# Patient Record
Sex: MALE
[De-identification: names, ages, dates, MRNs, and addresses within clinical notes are randomized per-mention and may not be internally consistent; named-entity substitution may affect disease eponyms.]

---

## 2024-07-10 PROBLEM — Z00.00 ENCOUNTER FOR PREVENTIVE HEALTH EXAMINATION: Status: ACTIVE | Noted: 2024-07-10

## 2024-07-11 ENCOUNTER — APPOINTMENT (OUTPATIENT)
Dept: SURGERY | Facility: CLINIC | Age: 69
End: 2024-07-11
Payer: COMMERCIAL

## 2024-07-11 VITALS
OXYGEN SATURATION: 97 % | HEART RATE: 102 BPM | WEIGHT: 144 LBS | DIASTOLIC BLOOD PRESSURE: 82 MMHG | BODY MASS INDEX: 21.33 KG/M2 | HEIGHT: 69 IN | SYSTOLIC BLOOD PRESSURE: 160 MMHG

## 2024-07-11 DIAGNOSIS — K40.90 UNILATERAL INGUINAL HERNIA, W/OUT OBSTRUCTION OR GANGRENE, NOT SPECIFIED AS RECURRENT: ICD-10-CM

## 2024-07-11 DIAGNOSIS — Z78.9 OTHER SPECIFIED HEALTH STATUS: ICD-10-CM

## 2024-07-11 DIAGNOSIS — K40.20 BILATERAL INGUINAL HERNIA, W/OUT OBSTRUCTION OR GANGRENE, NOT SPECIFIED AS RECURRENT: ICD-10-CM

## 2024-07-11 PROCEDURE — 99203 OFFICE O/P NEW LOW 30 MIN: CPT

## 2024-07-19 ENCOUNTER — OUTPATIENT (OUTPATIENT)
Dept: OUTPATIENT SERVICES | Facility: HOSPITAL | Age: 69
LOS: 1 days | End: 2024-07-19
Payer: MEDICARE

## 2024-07-19 VITALS
OXYGEN SATURATION: 98 % | WEIGHT: 145.06 LBS | HEIGHT: 69 IN | RESPIRATION RATE: 16 BRPM | SYSTOLIC BLOOD PRESSURE: 135 MMHG | HEART RATE: 76 BPM | DIASTOLIC BLOOD PRESSURE: 60 MMHG | TEMPERATURE: 99 F

## 2024-07-19 DIAGNOSIS — K40.90 UNILATERAL INGUINAL HERNIA, WITHOUT OBSTRUCTION OR GANGRENE, NOT SPECIFIED AS RECURRENT: ICD-10-CM

## 2024-07-19 DIAGNOSIS — Z01.818 ENCOUNTER FOR OTHER PREPROCEDURAL EXAMINATION: ICD-10-CM

## 2024-07-19 NOTE — H&P PST ADULT - ASSESSMENT
68 y/o Belgali-speaking male (accompanied by son, preferred ) with no reported PMH is diagnosed with unilateral inguinal hernia without obstruction or gangrene, not recurrent.     STOP BANG SCORE IS 2

## 2024-07-19 NOTE — H&P PST ADULT - GASTROINTESTINAL
soft/nontender/nondistended/normal active bowel sounds/no guarding/no rigidity/no organomegaly/no palpable leena negative

## 2024-07-19 NOTE — H&P PST ADULT - PROBLEM SELECTOR PLAN 1
Scheduled for right inguinoscrotal hernia repair with mesh on 7/24/2024  Preoperative instructions discussed and given to patient.   Patient agrees to follow up with surgeon's office for instructions prior to surgery   Discussed preprocedure skin preparation using  chlorhexidine gluconate 4% solution three days prior to  surgery - including the day of surgery  Instructed patient to avoid aspirin and aspirin products, over the counter medications such as vitamins and herbal medications, one week prior to surgery.  Take Tylenol as needed for pain  Follow up with PCP postoperatively for management of chronic conditions  Labs/ekg/mc completed with PCP - pending in PST  Patient verbalized understanding of instructions

## 2024-07-19 NOTE — H&P PST ADULT - NSWEIGHTCALCTOOLDRUG_GEN_A_CORE
Post-Care Instructions: I reviewed with the patient in detail post-care instructions. Patient is to wear sunprotection, and avoid picking at any of the treated lesions. Pt may apply Vaseline to crusted or scabbing areas. Include Z78.9 (Other Specified Conditions Influencing Health Status) As An Associated Diagnosis?: No Detail Level: Detailed Show Topical Anesthesia Variable?: Yes Consent: The patient's consent was obtained including but not limited to risks of crusting, scabbing, blistering, scarring, darker or lighter pigmentary change, recurrence, incomplete removal and infection. Medical Necessity Information: It is in your best interest to select a reason for this procedure from the list below. All of these items fulfill various CMS LCD requirements except the new and changing color options. Spray Paint Text: The liquid nitrogen was applied to the skin utilizing a spray paint frosting technique. Medical Necessity Clause: This procedure was medically necessary because the lesions that were treated were: Number Of Freeze-Thaw Cycles: 2 freeze-thaw cycles Application Tool (Optional): Cry-AC  used

## 2024-07-19 NOTE — H&P PST ADULT - HISTORY OF PRESENT ILLNESS
68 y/o Belgali-speaking male with no reported PMH complains of right groin mass that has been gradually getting bigger over the past 6 months. He is diagnosed with unilateral inguinal hernia without obstruction or gangrene, not recurrent. Patient is scheduled for right inguinoscrotal hernia repair with mesh on 7/24/2024

## 2024-07-22 PROCEDURE — G0463: CPT

## 2024-07-23 ENCOUNTER — TRANSCRIPTION ENCOUNTER (OUTPATIENT)
Age: 69
End: 2024-07-23

## 2024-07-24 ENCOUNTER — TRANSCRIPTION ENCOUNTER (OUTPATIENT)
Age: 69
End: 2024-07-24

## 2024-07-24 ENCOUNTER — APPOINTMENT (OUTPATIENT)
Dept: SURGERY | Facility: HOSPITAL | Age: 69
End: 2024-07-24

## 2024-07-24 ENCOUNTER — RESULT REVIEW (OUTPATIENT)
Age: 69
End: 2024-07-24

## 2024-07-25 PROBLEM — Z78.9 OTHER SPECIFIED HEALTH STATUS: Chronic | Status: ACTIVE | Noted: 2024-07-19

## 2024-07-31 ENCOUNTER — NON-APPOINTMENT (OUTPATIENT)
Age: 69
End: 2024-07-31

## 2024-08-01 ENCOUNTER — APPOINTMENT (OUTPATIENT)
Dept: SURGERY | Facility: CLINIC | Age: 69
End: 2024-08-01
Payer: COMMERCIAL

## 2024-08-01 DIAGNOSIS — K40.90 UNILATERAL INGUINAL HERNIA, W/OUT OBSTRUCTION OR GANGRENE, NOT SPECIFIED AS RECURRENT: ICD-10-CM

## 2024-08-01 PROCEDURE — 99024 POSTOP FOLLOW-UP VISIT: CPT

## 2024-08-01 NOTE — HISTORY OF PRESENT ILLNESS
[de-identified] : Mr. BECERRA is a 69 year y/o M w large bulge to both sides of the groin. Pt notes having increased bulge to the areas and here to be evaluated for inguinal hernias. Does not have much pain. Denies significant PMH. No home meds. No previous surgical history.   Pt here today for postop visit, S/P repair of right inguinal scrotal hernia 07/24/24.  Pt has some tenderness, but overall, feels well. No urinary complaints. No fevers or chills. Also has an inguinal hernia to the L side.

## 2024-08-01 NOTE — HISTORY OF PRESENT ILLNESS
[de-identified] : Mr. BECERRA is a 69 year y/o M w large bulge to both sides of the groin. Pt notes having increased bulge to the areas and here to be evaluated for inguinal hernias. Does not have much pain. Denies significant PMH. No home meds. No previous surgical history.   Pt here today for postop visit, S/P repair of right inguinal scrotal hernia 07/24/24.  Pt has some tenderness, but overall, feels well. No urinary complaints. No fevers or chills. Also has an inguinal hernia to the L side.

## 2024-08-01 NOTE — PLAN
[FreeTextEntry1] : Post operative wound care, activity and restrictions/precautions were reinforced. Patient was instructed to refrain from any heavy lifting >10-15 lbs for at least 4-6 weeks post operatively. Patient's questions and concerns addressed.  Mr. LESTER BECERRA  was informed of significance of findings. All options, risks and benefits were discussed at length. Informed consent for repair of LEFT inguinoscrotal hernia, with the use of mesh, and the potential post op complications were discussed, including infection, recurrence and other related complications.  The patient wishes to proceed with the planned surgery. We will schedule for surgery at the next available date, pending any required insurance pre-certification or pre-approval. Patient agrees to obtain any necessary pre-operative evaluations and testing prior to surgery. he was advised to seek immediate medical attention with any acute change in symptoms or with the development of any new or worsening symptoms.  Patient's questions and concerns addressed to patient's satisfaction, and patient verbalized an understanding of the information discussed.  Will schedule for the surgery at [facility] at [place]-- in 1 month.

## 2024-08-01 NOTE — PHYSICAL EXAM
[No Rash or Lesion] : No rash or lesion [Alert] : alert [Oriented to Person] : oriented to person [Oriented to Place] : oriented to place [Oriented to Time] : oriented to time [Calm] : calm [de-identified] : A/Ox3; NAD. appears comfortable [de-identified] : EOMI; sclera anicteric. [de-identified] : no cervical lymphadenopathy  [de-identified] : airway patent, no use of accessory muscles [de-identified] : Abd is soft, nondistended, no rebound or guarding. No abdominal masses. No abdominal tenderness. [de-identified] : incision site to the R side of groin healing well, no evidence of acute inflammation or infection no recurrence; L inguinoscrotal hernia  [de-identified] : +ROM, normal gait

## 2024-08-01 NOTE — HISTORY OF PRESENT ILLNESS
[de-identified] : Mr. BECERRA is a 69 year y/o M w large bulge to both sides of the groin. Pt notes having increased bulge to the areas and here to be evaluated for inguinal hernias. Does not have much pain. Denies significant PMH. No home meds. No previous surgical history.   Pt here today for postop visit, S/P repair of right inguinal scrotal hernia 07/24/24.  Pt has some tenderness, but overall, feels well. No urinary complaints. No fevers or chills. Also has an inguinal hernia to the L side.

## 2024-08-01 NOTE — PLAN
[FreeTextEntry1] : Post operative wound care, activity and restrictions/precautions were reinforced. Patient was instructed to refrain from any heavy lifting >10-15 lbs for at least 4-6 weeks post operatively. Patient's questions and concerns addressed.  Mr. LESTER BECERRA  was informed of significance of findings. All options, risks and benefits were discussed at length. Informed consent for repair of LEFT inguinoscrotal hernia, with the use of mesh, and the potential post op complications were discussed, including infection, recurrence and other related complications.  The patient wishes to proceed with the planned surgery. We will schedule for surgery at the next available date, pending any required insurance pre-certification or pre-approval. Patient agrees to obtain any necessary pre-operative evaluations and testing prior to surgery. he was advised to seek immediate medical attention with any acute change in symptoms or with the development of any new or worsening symptoms.  Patient's questions and concerns addressed to patient's satisfaction, and patient verbalized an understanding of the information discussed.  Will schedule for the surgery at [facility] at [place]-- in 1 month.

## 2024-08-01 NOTE — PHYSICAL EXAM
[No Rash or Lesion] : No rash or lesion [Alert] : alert [Oriented to Person] : oriented to person [Oriented to Place] : oriented to place [Oriented to Time] : oriented to time [Calm] : calm [de-identified] : A/Ox3; NAD. appears comfortable [de-identified] : EOMI; sclera anicteric. [de-identified] : no cervical lymphadenopathy  [de-identified] : airway patent, no use of accessory muscles [de-identified] : Abd is soft, nondistended, no rebound or guarding. No abdominal masses. No abdominal tenderness. [de-identified] : incision site to the R side of groin healing well, no evidence of acute inflammation or infection no recurrence; L inguinoscrotal hernia  [de-identified] : +ROM, normal gait

## 2024-08-01 NOTE — REASON FOR VISIT
[Post Op: _________] : a [unfilled] post op visit [Family Member] : family member [FreeTextEntry1] : S/P repair of right inguinal scrotal hernia 07/24/24

## 2024-08-01 NOTE — PHYSICAL EXAM
[No Rash or Lesion] : No rash or lesion [Alert] : alert [Oriented to Person] : oriented to person [Oriented to Place] : oriented to place [Oriented to Time] : oriented to time [Calm] : calm [de-identified] : A/Ox3; NAD. appears comfortable [de-identified] : EOMI; sclera anicteric. [de-identified] : no cervical lymphadenopathy  [de-identified] : airway patent, no use of accessory muscles [de-identified] : Abd is soft, nondistended, no rebound or guarding. No abdominal masses. No abdominal tenderness. [de-identified] : incision site to the R side of groin healing well, no evidence of acute inflammation or infection no recurrence; L inguinoscrotal hernia  [de-identified] : +ROM, normal gait

## 2024-08-01 NOTE — ASSESSMENT
[FreeTextEntry1] : IMP: 68 yo M with bilateral inguinoscrotal hernias; with R side(incarcerated) > L side.  S/P repair of right inguinal scrotal hernia 07/24/24.   Wound to R side of groin healing well- no recurrence.  Has a L inguinoscrotal hernia.

## 2024-08-01 NOTE — CONSULT LETTER
[Dear  ___] : Dear  [unfilled], [Consult Letter:] : I had the pleasure of evaluating your patient, [unfilled]. [Consult Closing:] : Thank you very much for allowing me to participate in the care of this patient.  If you have any questions, please do not hesitate to contact me. [Sincerely,] : Sincerely, [FreeTextEntry3] : Vineet Hernandez MD

## 2024-08-01 NOTE — PLAN
[FreeTextEntry1] : Post operative wound care, activity and restrictions/precautions were reinforced. Patient was instructed to refrain from any heavy lifting >10-15 lbs for at least 4-6 weeks post operatively. Patient's questions and concerns addressed.  Mr. LESTER BECERRA  was informed of significance of findings. All options, risks and benefits were discussed at length. Informed consent for repair of LEFT inguinoscrotal hernia, with the use of mesh, and the potential post op complications were discussed, including infection, recurrence and other related complications.  The patient wishes to proceed with the planned surgery. We will schedule for surgery at the next available date, pending any required insurance pre-certification or pre-approval. Patient agrees to obtain any necessary pre-operative evaluations and testing prior to surgery. he was advised to seek immediate medical attention with any acute change in symptoms or with the development of any new or worsening symptoms.  Patient's questions and concerns addressed to patient's satisfaction, and patient verbalized an understanding of the information discussed.  Will schedule for the surgery at [facility] at [place]-- in 1 month.

## 2024-08-01 NOTE — ASSESSMENT
[FreeTextEntry1] : IMP: 70 yo M with bilateral inguinoscrotal hernias; with R side(incarcerated) > L side.  S/P repair of right inguinal scrotal hernia 07/24/24.   Wound to R side of groin healing well- no recurrence.  Has a L inguinoscrotal hernia.

## 2024-08-19 ENCOUNTER — OUTPATIENT (OUTPATIENT)
Dept: OUTPATIENT SERVICES | Facility: HOSPITAL | Age: 69
LOS: 1 days | End: 2024-08-19
Payer: MEDICARE

## 2024-08-19 VITALS
TEMPERATURE: 98 F | HEART RATE: 70 BPM | RESPIRATION RATE: 16 BRPM | WEIGHT: 145.06 LBS | OXYGEN SATURATION: 98 % | HEIGHT: 69 IN | SYSTOLIC BLOOD PRESSURE: 150 MMHG | DIASTOLIC BLOOD PRESSURE: 79 MMHG

## 2024-08-19 DIAGNOSIS — K40.90 UNILATERAL INGUINAL HERNIA, WITHOUT OBSTRUCTION OR GANGRENE, NOT SPECIFIED AS RECURRENT: ICD-10-CM

## 2024-08-19 DIAGNOSIS — Z01.818 ENCOUNTER FOR OTHER PREPROCEDURAL EXAMINATION: ICD-10-CM

## 2024-08-19 DIAGNOSIS — Z98.890 OTHER SPECIFIED POSTPROCEDURAL STATES: Chronic | ICD-10-CM

## 2024-08-19 PROBLEM — Z78.9 OTHER SPECIFIED HEALTH STATUS: Chronic | Status: INACTIVE | Noted: 2024-07-19 | Resolved: 2024-08-19

## 2024-08-19 RX ORDER — SODIUM CHLORIDE 9 MG/ML
3 INJECTION INTRAMUSCULAR; INTRAVENOUS; SUBCUTANEOUS EVERY 8 HOURS
Refills: 0 | Status: DISCONTINUED | OUTPATIENT
Start: 2024-08-27 | End: 2024-09-10

## 2024-08-19 NOTE — H&P PST ADULT - HISTORY OF PRESENT ILLNESS
69 y.o male with PMHx significant for  Bilateral Inguinal hernia , s/p Right inguinal hernia repair 7/24/24 now schedule for Left Inguinoscrotal hernia Repair with Mesh on 8/27/24 with Dr Hernandez

## 2024-08-19 NOTE — H&P PST ADULT - PROBLEM SELECTOR PLAN 1
Pt schedule for Left Inguinoscrotal hernia Repair with Mesh on 8/27/24 with Dr Hernandez    Labs drawn by PCP - will f/u result  Pt was seen by PCP for Medical clearance 8/13 - will f/u report    Pt was  instructed to stop aspirin/ecotrin and all over the counter medication including vitamins and herbal supplements one week prior to surgery   Instructions given on the use of 4% chlorhexidine wash and Pt verbalized understanding of same   Pt Instructed to have nothing by mouth starting midnight day before surgery  Patient is to expect a phone call day before surgery between the hours of 430- 630pm giving arrival time for surgery   Written and verbal preoperative instructions given to patient with understanding verbalized via interpretation done by svetlana Chan Cameron- [phone]    Patient today with STOP bang score 2  Low  risk for FAIZA

## 2024-08-19 NOTE — H&P PST ADULT - ASSESSMENT
69 y.o male with Unilateral Inguinal hernia now schedule for Left Inguinoscrotal hernia Repair with Mesh on 8/27/24 with Dr David WATKINS 2

## 2024-08-19 NOTE — H&P PST ADULT - PATIENT'S PREFERRED PRONOUN
I have personally performed a face to face diagnostic evaluation on this patient. I have reviewed the ACP note and agree with the history, exam and plan of care, except as noted.
Him/He

## 2024-08-19 NOTE — H&P PST ADULT - NEGATIVE ENDOCRINE SYMPTOMS
Caller: Fransico James    Relationship to patient: Self    Best call back number: [phone]    Patient is needing: RECEIVED A CALL BACK FROM PT. HUB RESCHEDULE PT AT [place] LOCATION AND IS AWARE TO COMPLETE PSA PRIOR.THANK YOU.          no cold intolerance/no heat intolerance

## 2024-08-20 PROCEDURE — G0463: CPT

## 2024-08-26 ENCOUNTER — TRANSCRIPTION ENCOUNTER (OUTPATIENT)
Age: 69
End: 2024-08-26

## 2024-08-27 ENCOUNTER — RESULT REVIEW (OUTPATIENT)
Age: 69
End: 2024-08-27

## 2024-08-27 ENCOUNTER — TRANSCRIPTION ENCOUNTER (OUTPATIENT)
Age: 69
End: 2024-08-27

## 2024-08-27 ENCOUNTER — OUTPATIENT (OUTPATIENT)
Dept: OUTPATIENT SERVICES | Facility: HOSPITAL | Age: 69
LOS: 1 days | End: 2024-08-27
Payer: MEDICARE

## 2024-08-27 ENCOUNTER — APPOINTMENT (OUTPATIENT)
Dept: SURGERY | Facility: HOSPITAL | Age: 69
End: 2024-08-27

## 2024-08-27 VITALS
OXYGEN SATURATION: 98 % | DIASTOLIC BLOOD PRESSURE: 74 MMHG | WEIGHT: 145.06 LBS | TEMPERATURE: 98 F | HEIGHT: 69 IN | RESPIRATION RATE: 16 BRPM | SYSTOLIC BLOOD PRESSURE: 129 MMHG | HEART RATE: 72 BPM

## 2024-08-27 VITALS
OXYGEN SATURATION: 97 % | HEART RATE: 70 BPM | RESPIRATION RATE: 16 BRPM | TEMPERATURE: 98 F | SYSTOLIC BLOOD PRESSURE: 121 MMHG | DIASTOLIC BLOOD PRESSURE: 66 MMHG

## 2024-08-27 DIAGNOSIS — Z01.818 ENCOUNTER FOR OTHER PREPROCEDURAL EXAMINATION: ICD-10-CM

## 2024-08-27 DIAGNOSIS — Z98.890 OTHER SPECIFIED POSTPROCEDURAL STATES: Chronic | ICD-10-CM

## 2024-08-27 DIAGNOSIS — K40.90 UNILATERAL INGUINAL HERNIA, WITHOUT OBSTRUCTION OR GANGRENE, NOT SPECIFIED AS RECURRENT: ICD-10-CM

## 2024-08-27 LAB
ALBUMIN SERPL ELPH-MCNC: 3.8 G/DL — SIGNIFICANT CHANGE UP (ref 3.5–5)
ALP SERPL-CCNC: 97 U/L — SIGNIFICANT CHANGE UP (ref 40–120)
ALT FLD-CCNC: 23 U/L DA — SIGNIFICANT CHANGE UP (ref 10–60)
ANION GAP SERPL CALC-SCNC: 7 MMOL/L — SIGNIFICANT CHANGE UP (ref 5–17)
AST SERPL-CCNC: 15 U/L — SIGNIFICANT CHANGE UP (ref 10–40)
BILIRUB SERPL-MCNC: 0.5 MG/DL — SIGNIFICANT CHANGE UP (ref 0.2–1.2)
BUN SERPL-MCNC: 15 MG/DL — SIGNIFICANT CHANGE UP (ref 7–18)
CALCIUM SERPL-MCNC: 8.9 MG/DL — SIGNIFICANT CHANGE UP (ref 8.4–10.5)
CHLORIDE SERPL-SCNC: 109 MMOL/L — HIGH (ref 96–108)
CO2 SERPL-SCNC: 25 MMOL/L — SIGNIFICANT CHANGE UP (ref 22–31)
CREAT SERPL-MCNC: 0.83 MG/DL — SIGNIFICANT CHANGE UP (ref 0.5–1.3)
EGFR: 94 ML/MIN/1.73M2 — SIGNIFICANT CHANGE UP
GLUCOSE SERPL-MCNC: 117 MG/DL — HIGH (ref 70–99)
HCT VFR BLD CALC: 37.4 % — LOW (ref 39–50)
HGB BLD-MCNC: 12.2 G/DL — LOW (ref 13–17)
MCHC RBC-ENTMCNC: 30 PG — SIGNIFICANT CHANGE UP (ref 27–34)
MCHC RBC-ENTMCNC: 32.6 GM/DL — SIGNIFICANT CHANGE UP (ref 32–36)
MCV RBC AUTO: 92.1 FL — SIGNIFICANT CHANGE UP (ref 80–100)
NRBC # BLD: 0 /100 WBCS — SIGNIFICANT CHANGE UP (ref 0–0)
PLATELET # BLD AUTO: 279 K/UL — SIGNIFICANT CHANGE UP (ref 150–400)
POTASSIUM SERPL-MCNC: 4.1 MMOL/L — SIGNIFICANT CHANGE UP (ref 3.5–5.3)
POTASSIUM SERPL-SCNC: 4.1 MMOL/L — SIGNIFICANT CHANGE UP (ref 3.5–5.3)
PROT SERPL-MCNC: 7.9 G/DL — SIGNIFICANT CHANGE UP (ref 6–8.3)
RBC # BLD: 4.06 M/UL — LOW (ref 4.2–5.8)
RBC # FLD: 12.6 % — SIGNIFICANT CHANGE UP (ref 10.3–14.5)
SODIUM SERPL-SCNC: 141 MMOL/L — SIGNIFICANT CHANGE UP (ref 135–145)
WBC # BLD: 6.84 K/UL — SIGNIFICANT CHANGE UP (ref 3.8–10.5)
WBC # FLD AUTO: 6.84 K/UL — SIGNIFICANT CHANGE UP (ref 3.8–10.5)

## 2024-08-27 PROCEDURE — 36415 COLL VENOUS BLD VENIPUNCTURE: CPT

## 2024-08-27 PROCEDURE — 88304 TISSUE EXAM BY PATHOLOGIST: CPT

## 2024-08-27 PROCEDURE — 49505 PRP I/HERN INIT REDUC >5 YR: CPT | Mod: LT

## 2024-08-27 PROCEDURE — 85027 COMPLETE CBC AUTOMATED: CPT

## 2024-08-27 PROCEDURE — 49507 PRP I/HERN INIT BLOCK >5 YR: CPT | Mod: 79,LT

## 2024-08-27 PROCEDURE — 80053 COMPREHEN METABOLIC PANEL: CPT

## 2024-08-27 PROCEDURE — 88304 TISSUE EXAM BY PATHOLOGIST: CPT | Mod: 26

## 2024-08-27 PROCEDURE — C1781: CPT

## 2024-08-27 DEVICE — MESH HERNIA PERFIX PLUG LARGE 1.6 X 1.90": Type: IMPLANTABLE DEVICE | Site: LEFT | Status: FUNCTIONAL

## 2024-08-27 DEVICE — MESH HERNIA MARLEX 3 X 6": Type: IMPLANTABLE DEVICE | Site: LEFT | Status: FUNCTIONAL

## 2024-08-27 DEVICE — MESH HERNIA PERFIX PLUG EXTRA LARGE 1.6 X 2": Type: IMPLANTABLE DEVICE | Site: LEFT | Status: FUNCTIONAL

## 2024-08-27 DEVICE — MESH HERNIA PERFIX PLUG MEDIUM 1.3 X 1.55": Type: IMPLANTABLE DEVICE | Site: LEFT | Status: FUNCTIONAL

## 2024-08-27 RX ORDER — ONDANSETRON 2 MG/ML
4 INJECTION, SOLUTION INTRAMUSCULAR; INTRAVENOUS ONCE
Refills: 0 | Status: DISCONTINUED | OUTPATIENT
Start: 2024-08-27 | End: 2024-08-27

## 2024-08-27 RX ORDER — FENTANYL CITRATE 50 UG/ML
25 INJECTION INTRAMUSCULAR; INTRAVENOUS
Refills: 0 | Status: DISCONTINUED | OUTPATIENT
Start: 2024-08-27 | End: 2024-08-27

## 2024-08-27 RX ORDER — FENTANYL CITRATE 50 UG/ML
50 INJECTION INTRAMUSCULAR; INTRAVENOUS
Refills: 0 | Status: DISCONTINUED | OUTPATIENT
Start: 2024-08-27 | End: 2024-08-27

## 2024-08-27 RX ADMIN — SODIUM CHLORIDE 3 MILLILITER(S): 9 INJECTION INTRAMUSCULAR; INTRAVENOUS; SUBCUTANEOUS at 07:18

## 2024-08-27 NOTE — ASU DISCHARGE PLAN (ADULT/PEDIATRIC) - CARE PROVIDER_API CALL
Vineet Hernandez.  Surgery  [address]  [place] [address]  Phone: [phone]  Fax: [phone]  Follow Up Time:

## 2024-08-27 NOTE — BRIEF OPERATIVE NOTE - OPERATION/FINDINGS
Hernia sac identified with bowel contents as well as presence of hernia sac lipoma, which was resected and sent to pathology.

## 2024-08-27 NOTE — ASU PATIENT PROFILE, ADULT - SPIRITUAL CULTURAL, CURRENT SITUATION, PROFILE
----- Message from Emmy Muir PA-C sent at 5/6/2024  3:18 PM CDT -----  Labs show high fasting glucose (pre-diabetes). His liver enzymes are elevated. Please ask him about alcohol use and/or tylenol use. Would need to cut down on these. Vit D is is slightly low. Normal blood counts, thyroid levels, Vit B12 and folate. Testosterone level is normal. Please send in VIt D 50,000iu weekly x 8 weeks then repeat level. Also repeat fasting CMP in 8 weeks to recheck glucose and liver enzymes.    none

## 2024-08-27 NOTE — ASU PATIENT PROFILE, ADULT - MENTAL HEALTH CONDITIONS/SYMPTOMS, PROFILE
Medical Necessity Clause: This procedure was medically necessary because the lesions that were treated were:
X Size Of Lesion In Cm (Optional): 0
Consent: The risks of atrophy were reviewed with the patient.
Include Z78.9 (Other Specified Conditions Influencing Health Status) As An Associated Diagnosis?: No
Kenalog Preparation: Kenalog
Concentration Of Solution Injected (Mg/Ml): 5.0
Total Volume Injected (Ccs- Only Use Numbers And Decimals): 0.2
Validate Note Data When Using Inventory: Yes
Detail Level: Detailed
none

## 2024-08-27 NOTE — ASU DISCHARGE PLAN (ADULT/PEDIATRIC) - NS MD DC FALL RISK RISK
For information on Fall & Injury Prevention, visit: [email or web address] OR  [email or web address] OR  https://www.cdc.gov/steadi/patient.html

## 2024-08-27 NOTE — ASU PATIENT PROFILE, ADULT - FALL HARM RISK - HARM RISK INTERVENTIONS

## 2024-09-03 PROBLEM — Z87.19 PERSONAL HISTORY OF OTHER DISEASES OF THE DIGESTIVE SYSTEM: Chronic | Status: ACTIVE | Noted: 2024-08-19

## 2024-09-04 LAB — SURGICAL PATHOLOGY STUDY: SIGNIFICANT CHANGE UP

## 2024-09-12 ENCOUNTER — APPOINTMENT (OUTPATIENT)
Dept: SURGERY | Facility: CLINIC | Age: 69
End: 2024-09-12
Payer: COMMERCIAL

## 2024-09-12 DIAGNOSIS — K40.20 BILATERAL INGUINAL HERNIA, W/OUT OBSTRUCTION OR GANGRENE, NOT SPECIFIED AS RECURRENT: ICD-10-CM

## 2024-09-12 PROCEDURE — 99024 POSTOP FOLLOW-UP VISIT: CPT

## 2024-09-12 NOTE — PHYSICAL EXAM
[No Rash or Lesion] : No rash or lesion [Alert] : alert [Oriented to Person] : oriented to person [Oriented to Place] : oriented to place [Oriented to Time] : oriented to time [Calm] : calm [de-identified] : A/Ox3; NAD. appears comfortable [de-identified] : EOMI; sclera anicteric. [de-identified] : no cervical lymphadenopathy  [de-identified] : airway patent, no use of accessory muscles [de-identified] : Abd is soft, nondistended, no rebound or guarding. No abdominal masses. No abdominal tenderness. [de-identified] : incision site to the L side of groin healing well, no evidence of acute inflammation or infection no recurrence;  [de-identified] : +ROM, normal gait

## 2024-09-12 NOTE — PLAN
[FreeTextEntry1] : Post operative wound care, activity and restrictions/precautions were reinforced. Patient was instructed to refrain from any heavy lifting >10-15 lbs for at least 4-6 weeks post operatively.   Patient's questions and concerns addressed. patient will follow up if needed. Warning signs, follow up, and restrictions were discussed with the patient.

## 2024-09-12 NOTE — REASON FOR VISIT
[Post Op: _________] : a [unfilled] post op visit [FreeTextEntry1] : S/P repair of left inguinal scrotal hernia with mesh  [FreeTextEntry2] : 08/27/24

## 2024-09-12 NOTE — HISTORY OF PRESENT ILLNESS
[de-identified] : Mr. BECERRA is a 69 year y/o M w large bulge to both sides of the groin. Pt notes having increased bulge to the areas and here to be evaluated for inguinal hernias. Does not have much pain. Denies significant PMH. No home meds. No previous surgical history.  S/P repair of R inguinal hernia w mesh, 07/24/24.   Pt here today for postop visit, S/P repair of left inguinal scrotal hernia with mesh 08/27/24.  Pt is feeling well. No reported complaints. No pain. No urinary complaints. No fevers or chills.

## 2024-09-12 NOTE — PHYSICAL EXAM
[No Rash or Lesion] : No rash or lesion [Alert] : alert [Oriented to Person] : oriented to person [Oriented to Place] : oriented to place [Oriented to Time] : oriented to time [Calm] : calm [de-identified] : A/Ox3; NAD. appears comfortable [de-identified] : EOMI; sclera anicteric. [de-identified] : no cervical lymphadenopathy  [de-identified] : airway patent, no use of accessory muscles [de-identified] : Abd is soft, nondistended, no rebound or guarding. No abdominal masses. No abdominal tenderness. [de-identified] : incision site to the L side of groin healing well, no evidence of acute inflammation or infection no recurrence;  [de-identified] : +ROM, normal gait

## 2024-09-12 NOTE — ASSESSMENT
[FreeTextEntry1] : IMP: 68 yo M with bilateral inguinoscrotal hernias; with R side(incarcerated) > L side.  S/P repair of right inguinal scrotal hernia 07/24/24.   Here today for postop, S/P repair of left inguinal scrotal hernia with mesh 08/27/24.   Patient is doing well, with excellent post-operative recovery. Wounds are healing well and as expected. There is no evidence of infection or complication, and patient is progressing as expected.

## 2024-09-12 NOTE — PHYSICAL EXAM
[No Rash or Lesion] : No rash or lesion [Alert] : alert [Oriented to Person] : oriented to person [Oriented to Place] : oriented to place [Oriented to Time] : oriented to time [Calm] : calm [de-identified] : A/Ox3; NAD. appears comfortable [de-identified] : EOMI; sclera anicteric. [de-identified] : no cervical lymphadenopathy  [de-identified] : airway patent, no use of accessory muscles [de-identified] : Abd is soft, nondistended, no rebound or guarding. No abdominal masses. No abdominal tenderness. [de-identified] : incision site to the L side of groin healing well, no evidence of acute inflammation or infection no recurrence;  [de-identified] : +ROM, normal gait

## 2024-09-12 NOTE — HISTORY OF PRESENT ILLNESS
[de-identified] : Mr. BECERRA is a 69 year y/o M w large bulge to both sides of the groin. Pt notes having increased bulge to the areas and here to be evaluated for inguinal hernias. Does not have much pain. Denies significant PMH. No home meds. No previous surgical history.  S/P repair of R inguinal hernia w mesh, 07/24/24.   Pt here today for postop visit, S/P repair of left inguinal scrotal hernia with mesh 08/27/24.  Pt is feeling well. No reported complaints. No pain. No urinary complaints. No fevers or chills.

## 2024-09-12 NOTE — REVIEW OF SYSTEMS
[FreeTextEntry1] : here for followup of hypertension\par med for tremors increased but makes him sedated; neuro offered gabapentin\par wants gu referral re: ED [de-identified] : His home blood pressure monitoring levels were higher after decreasing his medication last time. \par Compared to last visit the hypertension is IMPROVED. Lifestyle modifications that the patient has adopted include dietary sodium reduction, increasing physical activity, attempts at weight reduction. Responses to the medications has been good . Adverse effects of the medication include none . Patient denies chest pain , palpitations, headaches, but + occasional orthostatic dizziness. \par      Signs of abuse or neglect? No. Fall Risk/History of Falling No.  His weight has CHANGED BY -6\par  Last LDL -  Sept [Negative] : Heme/Lymph

## 2024-09-12 NOTE — ASSESSMENT
[FreeTextEntry1] : IMP: 70 yo M with bilateral inguinoscrotal hernias; with R side(incarcerated) > L side.  S/P repair of right inguinal scrotal hernia 07/24/24.   Here today for postop, S/P repair of left inguinal scrotal hernia with mesh 08/27/24.   Patient is doing well, with excellent post-operative recovery. Wounds are healing well and as expected. There is no evidence of infection or complication, and patient is progressing as expected.

## 2024-09-12 NOTE — HISTORY OF PRESENT ILLNESS
[de-identified] : Mr. BECERRA is a 69 year y/o M w large bulge to both sides of the groin. Pt notes having increased bulge to the areas and here to be evaluated for inguinal hernias. Does not have much pain. Denies significant PMH. No home meds. No previous surgical history.  S/P repair of R inguinal hernia w mesh, 07/24/24.   Pt here today for postop visit, S/P repair of left inguinal scrotal hernia with mesh 08/27/24.  Pt is feeling well. No reported complaints. No pain. No urinary complaints. No fevers or chills.

## (undated) DEVICE — PACK MINOR NO DRAPE

## (undated) DEVICE — DRAPE LAPAROTOMY W POUCHES

## (undated) DEVICE — SOL IRR POUR NS 0.9% 1500ML

## (undated) DEVICE — SUT SOFSILK 2-0 30" V-20

## (undated) DEVICE — NDL HYPO SAFE 22G X 1.5" (BLACK)

## (undated) DEVICE — DRAPE LIGHT HANDLE COVER (BLUE)

## (undated) DEVICE — SUT POLYSORB 2-0 18" TIES UNDYED

## (undated) DEVICE — SUT POLYSORB 2-0 30" V-20 UNDYED

## (undated) DEVICE — WARMING BLANKET UPPER ADULT

## (undated) DEVICE — DRSG CURITY GAUZE SPONGE 4 X 4" 12-PLY

## (undated) DEVICE — DRSG TEGADERM 4X4.75"

## (undated) DEVICE — ELCTR GROUNDING PAD ADULT COVIDIEN

## (undated) DEVICE — PREP CHLORAPREP HI-LITE ORANGE 26ML

## (undated) DEVICE — SPONGE DISSECTOR PEANUT

## (undated) DEVICE — SUT SURGIPRO 2-0 30" GS-22

## (undated) DEVICE — DRAIN PENROSE 5/8" X 18" LATEX

## (undated) DEVICE — FOR-ESU VALLEYLAB T7E14982DX: Type: DURABLE MEDICAL EQUIPMENT

## (undated) DEVICE — SUT POLYSORB 3-0 30" V-20 UNDYED

## (undated) DEVICE — SUT POLYSORB 4-0 27" P-12 UNDYED

## (undated) DEVICE — DRSG MASTISOL

## (undated) DEVICE — GLV 7 PROTEXIS (WHITE)

## (undated) DEVICE — VENODYNE/SCD SLEEVE CALF MEDIUM